# Patient Record
Sex: MALE | Race: WHITE | Employment: STUDENT | URBAN - METROPOLITAN AREA
[De-identification: names, ages, dates, MRNs, and addresses within clinical notes are randomized per-mention and may not be internally consistent; named-entity substitution may affect disease eponyms.]

---

## 2022-04-12 ENCOUNTER — WART (OUTPATIENT)
Dept: URBAN - METROPOLITAN AREA CLINIC 32 | Facility: CLINIC | Age: 32
Setting detail: DERMATOLOGY
End: 2022-04-12

## 2022-04-12 DIAGNOSIS — L70.0 ACNE VULGARIS: ICD-10-CM

## 2022-04-12 DIAGNOSIS — L82.1 OTHER SEBORRHEIC KERATOSIS: ICD-10-CM

## 2022-04-12 DIAGNOSIS — D22.5 MELANOCYTIC NEVI OF TRUNK: ICD-10-CM

## 2022-04-12 PROCEDURE — 17110 DESTRUCT B9 LESION 1-14: CPT

## 2022-04-26 ENCOUNTER — FOLLOW UP (OUTPATIENT)
Dept: URBAN - METROPOLITAN AREA CLINIC 32 | Facility: CLINIC | Age: 32
Setting detail: DERMATOLOGY
End: 2022-04-26

## 2022-04-26 DIAGNOSIS — L40.0 PSORIASIS VULGARIS: ICD-10-CM

## 2022-04-26 PROCEDURE — 17110 DESTRUCT B9 LESION 1-14: CPT

## 2022-05-10 ENCOUNTER — FOLLOW UP (OUTPATIENT)
Dept: URBAN - METROPOLITAN AREA CLINIC 32 | Facility: CLINIC | Age: 32
Setting detail: DERMATOLOGY
End: 2022-05-10

## 2022-05-10 DIAGNOSIS — L72.3 SEBACEOUS CYST: ICD-10-CM

## 2022-05-10 PROCEDURE — 17110 DESTRUCT B9 LESION 1-14: CPT

## 2022-05-24 ENCOUNTER — FOLLOW UP (OUTPATIENT)
Dept: URBAN - METROPOLITAN AREA CLINIC 32 | Facility: CLINIC | Age: 32
Setting detail: DERMATOLOGY
End: 2022-05-24

## 2022-05-24 DIAGNOSIS — L57.0 ACTINIC KERATOSIS: ICD-10-CM

## 2022-05-24 PROCEDURE — 17110 DESTRUCT B9 LESION 1-14: CPT

## 2022-06-07 ENCOUNTER — FOLLOW UP (OUTPATIENT)
Dept: URBAN - METROPOLITAN AREA CLINIC 32 | Facility: CLINIC | Age: 32
Setting detail: DERMATOLOGY
End: 2022-06-07

## 2022-06-07 DIAGNOSIS — L82.1 OTHER SEBORRHEIC KERATOSIS: ICD-10-CM

## 2022-06-07 DIAGNOSIS — L81.0 POSTINFLAMMATORY HYPERPIGMENTATION: ICD-10-CM

## 2022-06-07 PROCEDURE — 17110 DESTRUCT B9 LESION 1-14: CPT

## 2022-06-30 ENCOUNTER — FOLLOW UP (OUTPATIENT)
Dept: URBAN - METROPOLITAN AREA CLINIC 32 | Facility: CLINIC | Age: 32
Setting detail: DERMATOLOGY
End: 2022-06-30

## 2022-06-30 DIAGNOSIS — Z79.899 OTHER LONG-TERM (CURRENT) DRUG THERAPY: ICD-10-CM

## 2022-06-30 PROCEDURE — OTHER NO CHARGE OFFICE VISIT: OTHER

## 2025-01-06 ENCOUNTER — APPOINTMENT (OUTPATIENT)
Dept: URBAN - METROPOLITAN AREA CLINIC 208 | Age: 35
Setting detail: DERMATOLOGY
End: 2025-01-09

## 2025-01-06 DIAGNOSIS — L57.8 OTHER SKIN CHANGES DUE TO CHRONIC EXPOSURE TO NONIONIZING RADIATION: ICD-10-CM

## 2025-01-06 DIAGNOSIS — D18.0 HEMANGIOMA: ICD-10-CM

## 2025-01-06 DIAGNOSIS — L82.1 OTHER SEBORRHEIC KERATOSIS: ICD-10-CM

## 2025-01-06 DIAGNOSIS — B07.8 OTHER VIRAL WARTS: ICD-10-CM

## 2025-01-06 DIAGNOSIS — D22 MELANOCYTIC NEVI: ICD-10-CM

## 2025-01-06 DIAGNOSIS — L81.4 OTHER MELANIN HYPERPIGMENTATION: ICD-10-CM

## 2025-01-06 DIAGNOSIS — L70.0 ACNE VULGARIS: ICD-10-CM

## 2025-01-06 DIAGNOSIS — L71.8 OTHER ROSACEA: ICD-10-CM

## 2025-01-06 DIAGNOSIS — L21.8 OTHER SEBORRHEIC DERMATITIS: ICD-10-CM

## 2025-01-06 PROBLEM — D22.5 MELANOCYTIC NEVI OF TRUNK: Status: ACTIVE | Noted: 2025-01-06

## 2025-01-06 PROBLEM — D18.01 HEMANGIOMA OF SKIN AND SUBCUTANEOUS TISSUE: Status: ACTIVE | Noted: 2025-01-06

## 2025-01-06 PROCEDURE — OTHER COUNSELING: OTHER

## 2025-01-06 PROCEDURE — OTHER PRESCRIPTION MEDICATION MANAGEMENT: OTHER

## 2025-01-06 PROCEDURE — OTHER SUNSCREEN RECOMMENDATIONS: OTHER

## 2025-01-06 PROCEDURE — 99214 OFFICE O/P EST MOD 30 MIN: CPT

## 2025-01-06 PROCEDURE — OTHER MIPS QUALITY: OTHER

## 2025-01-06 PROCEDURE — OTHER OTC TREATMENT REGIMEN: OTHER

## 2025-01-06 PROCEDURE — OTHER ADDITIONAL NOTES: OTHER

## 2025-01-06 PROCEDURE — OTHER DEFER: OTHER

## 2025-01-06 ASSESSMENT — LOCATION ZONE DERM
LOCATION ZONE: SCALP
LOCATION ZONE: TRUNK
LOCATION ZONE: NECK
LOCATION ZONE: ARM
LOCATION ZONE: ARM
LOCATION ZONE: HAND
LOCATION ZONE: FACE

## 2025-01-06 ASSESSMENT — LOCATION DETAILED DESCRIPTION DERM
LOCATION DETAILED: RIGHT POSTERIOR SHOULDER
LOCATION DETAILED: LEFT SUPERIOR PARIETAL SCALP
LOCATION DETAILED: INFERIOR THORACIC SPINE
LOCATION DETAILED: EPIGASTRIC SKIN
LOCATION DETAILED: LEFT PROXIMAL POSTERIOR UPPER ARM
LOCATION DETAILED: RIGHT THENAR EMINENCE
LOCATION DETAILED: RIGHT CLAVICULAR NECK
LOCATION DETAILED: INFERIOR MID FOREHEAD
LOCATION DETAILED: LEFT MEDIAL UPPER BACK
LOCATION DETAILED: LEFT CLAVICULAR NECK

## 2025-01-06 ASSESSMENT — LOCATION SIMPLE DESCRIPTION DERM
LOCATION SIMPLE: RIGHT HAND
LOCATION SIMPLE: RIGHT SHOULDER
LOCATION SIMPLE: INFERIOR FOREHEAD
LOCATION SIMPLE: LEFT UPPER BACK
LOCATION SIMPLE: RIGHT ANTERIOR NECK
LOCATION SIMPLE: LEFT UPPER ARM
LOCATION SIMPLE: LEFT ANTERIOR NECK
LOCATION SIMPLE: ABDOMEN
LOCATION SIMPLE: UPPER BACK
LOCATION SIMPLE: SCALP

## 2025-01-06 NOTE — PROCEDURE: DEFER
Size Of Lesion In Cm (Optional): 0
Detail Level: Detailed
Introduction Text (Please End With A Colon): :
Other Procedure: cantharidin 0.7%

## 2025-01-06 NOTE — PROCEDURE: OTC TREATMENT REGIMEN
Detail Level: Simple
Patient Specific Otc Recommendations (Will Not Stick From Patient To Patient): Dove dryness and itch relief shampoo
Patient Specific Otc Recommendations (Will Not Stick From Patient To Patient): Recommended using Vanicream Zbar for face

## 2025-01-06 NOTE — PROCEDURE: COUNSELING
Detail Level: Generalized
Detail Level: Detailed
Detail Level: Simple
Detail Level: Zone
Topical Salicylic Acid Recommendations: Wart Stick apply qhs

## 2025-01-06 NOTE — PROCEDURE: ADDITIONAL NOTES
Render Risk Assessment In Note?: no
Additional Notes: F/u if not resolved for  further evaluation /biopsy
Detail Level: Simple

## 2025-01-06 NOTE — PROCEDURE: SUNSCREEN RECOMMENDATIONS
General Sunscreen Counseling: I recommended a broad spectrum sunscreen with a SPF of 30 or higher.  I explained that SPF 30 sunscreens block approximately 97 percent of the sun's harmful rays.  Sunscreens should be applied at least 15 minutes prior to expected sun exposure and then every 2 hours after that as long as sun exposure continues. If swimming or exercising sunscreen should be reapplied every 45 minutes to an hour after getting wet or sweating.  One ounce, or the equivalent of a shot glass full of sunscreen, is adequate to protect the skin not covered by a bathing suit. I also recommended a lip balm with a sunscreen as well. Sun protective clothing can be used in lieu of sunscreen but must be worn the entire time you are exposed to the sun's rays.
Products Recommended: Sun protective clothing of all colors can be found at sites like Coolibar, Solumbra, Solbari, UVSkinz, Naviskin, Uniglo, [place], Luminara, BloqUV\\nOutdoor brands like Lands End, Athleta, LL. Bean, Ivan Wolf, [place], ASTRID, Free Fly, [place], Under [place] Sun [place] Ridge\\nFashion brands like Sun50, Adelaide Pulitzer, Mott50, Baleaf, Epoque Evolution, J. Crew, Milady Lion\\nSun hats at [place] hats, Mott50, Sun'n'sand, Tara You, Wallaroo, Sunhatsbyroni, Coolibar and above outdoor brands\\n\\nSunscreen Recommendations for All Skin Colors (* = mineral based)\\nPurchase Here:\\nEltaMD Clear SPF 46; Also comes Clear Tinted (for oily/acne/rosacea/seb derm/sensitive skin)\\nEltaMD Daily (for dry skin)\\n*Colorescience Face Shield 50 (a fave)\\n*ISDIN Eryfotona Actinica (all skin types, helps with precancers)\\n*Colorscience, Supergoop and ISDIN powder brushes (great for reapplication)\\n*Skinbetter products (stick for active/sweating)\\n*Revision Intellishade (tinted; Matte and Original options; Antiaging)\\n\\nKorean and Japanese Sunscreens From [employer] and [email or web address]- Good for Acne prone, sunburn-prone skin. Have wonderful light-weight textures that disappear into the skin!\\nBeauty of [place] - Relief Sun\\nBeauty of [place] - Matte Sun Stick\\nSKIN 1004 - [place] Centella Hyalu-Cica Water-Fit Sun Serum\\nIsntree - Hyaluronic Acid Watery Sun Gel\\nROUND LAB - Birch Juice Moisturizing Sunscreen\\nBeauty of [place] - Relief Sun\\nKao - Biore UV Aqua Rich Watery Essence Sunscreen SPF 50+ PA++++\\n\\nFrom Local Store:\\nCeraVe Ultra Light Moisturizing lotion SPF 30 (acne prone; very sheer)\\nCeraVe AM (what I use)\\n*CeraVe Hydrating Mineral SPF (tinted)\\nOlay Complete SPF 30\\nCetaphil Pro Oil Control SPF 30 (acne prone)\\n*First Aid Beauty Weightless Liquid Mineral SPF 30\\nNeutrogena Clear Face SPF 55 (acne prone)\\n*Neutrogena with Purescreen technology (good for sensitive skin; also has tinted version)\\nSupergoop Glow Screen (tinted)\\n*Supergoop Mineral Sheer Screen (also serves as a primer before makeup)\\n\\nFor Very Sensitive skin:\\n*Vanicream (another fave)\\n*Sunbum mineral\\n*Blue lizard sensitive\\nAveeno with feverfew moisturizer (rosacea)\\n\\nPowder sunscreens, good for reapplication and for scalp areas:\\n*Supergoop Re(setting) Mineral powder\\n*Supergoop Poof Part Powder\\n*Isdinceutics Mineral Brush\\n*Colorscience Sunforgettable Mineral Powder\\n\\nOther favorites for skin of color: *TIZO3 mineral tinted *Coola mineral or tinted Solbar *Topix Sheer Physical *Alastin Hydratint *It Cosmetics CC+ cream (also is antiaging)\\n\\nFor concerns with hyperpigmentation (skin darkening), look for tinted sunscreens with iron oxide protection from blue light - Needed for treating Melasma! Need to reapply every 2 hours, so I recommend one of the powder sunscreens for that. Add topical vitamin C and computer bluescreen protector!! Remember, visible light matters too.\\n\\nFor MEN, I'll usually recommend Elta MD Clear. Other good ones are CeraVe Ultra Light, Supergoop Unseen, La Roche Posay Melt in Milk
Detail Level: Generalized

## 2025-01-06 NOTE — HPI: FULL BODY SKIN EXAMINATION
What Is The Reason For Today's Visit?: Full Body Skin Examination
What Is The Reason For Today's Visit? (Being Monitored For X): concerning skin lesions on an annual basis
Additional History: Itchy  scalp \\nPossible wart on hand

## 2025-01-06 NOTE — PROCEDURE: PRESCRIPTION MEDICATION MANAGEMENT
Plan: Offered to send prescription shampoo, but pt declined today
Render In Strict Bullet Format?: No
Detail Level: Simple
Plan: Discussed sending Rhofade prescription cream for redness, but pt declined today